# Patient Record
Sex: MALE | Race: WHITE | NOT HISPANIC OR LATINO
[De-identification: names, ages, dates, MRNs, and addresses within clinical notes are randomized per-mention and may not be internally consistent; named-entity substitution may affect disease eponyms.]

---

## 2017-04-26 PROBLEM — Z00.00 ENCOUNTER FOR PREVENTIVE HEALTH EXAMINATION: Status: ACTIVE | Noted: 2017-04-26

## 2017-08-28 ENCOUNTER — APPOINTMENT (OUTPATIENT)
Dept: CARDIOLOGY | Facility: CLINIC | Age: 72
End: 2017-08-28
Payer: MEDICARE

## 2017-08-28 PROCEDURE — 99214 OFFICE O/P EST MOD 30 MIN: CPT

## 2017-08-28 PROCEDURE — 93000 ELECTROCARDIOGRAM COMPLETE: CPT

## 2017-08-31 ENCOUNTER — APPOINTMENT (OUTPATIENT)
Dept: CARDIOLOGY | Facility: CLINIC | Age: 72
End: 2017-08-31
Payer: MEDICARE

## 2017-08-31 PROCEDURE — 93306 TTE W/DOPPLER COMPLETE: CPT

## 2017-09-11 ENCOUNTER — APPOINTMENT (OUTPATIENT)
Dept: CARDIOLOGY | Facility: CLINIC | Age: 72
End: 2017-09-11
Payer: MEDICARE

## 2017-09-11 DIAGNOSIS — Z78.9 OTHER SPECIFIED HEALTH STATUS: ICD-10-CM

## 2017-09-11 DIAGNOSIS — Z86.79 PERSONAL HISTORY OF OTHER DISEASES OF THE CIRCULATORY SYSTEM: ICD-10-CM

## 2017-09-11 DIAGNOSIS — Z87.39 PERSONAL HISTORY OF OTHER DISEASES OF THE MUSCULOSKELETAL SYSTEM AND CONNECTIVE TISSUE: ICD-10-CM

## 2017-09-11 DIAGNOSIS — Z87.891 PERSONAL HISTORY OF NICOTINE DEPENDENCE: ICD-10-CM

## 2017-09-11 DIAGNOSIS — Z80.9 FAMILY HISTORY OF MALIGNANT NEOPLASM, UNSPECIFIED: ICD-10-CM

## 2017-09-11 PROCEDURE — 99214 OFFICE O/P EST MOD 30 MIN: CPT

## 2018-08-15 ENCOUNTER — APPOINTMENT (OUTPATIENT)
Dept: CARDIOLOGY | Facility: CLINIC | Age: 73
End: 2018-08-15
Payer: MEDICARE

## 2018-08-15 PROCEDURE — 93306 TTE W/DOPPLER COMPLETE: CPT

## 2018-08-17 ENCOUNTER — RECORD ABSTRACTING (OUTPATIENT)
Age: 73
End: 2018-08-17

## 2018-08-17 PROBLEM — Z78.9 CURRENT NON-DRINKER OF ALCOHOL: Status: ACTIVE | Noted: 2018-08-17

## 2018-08-17 PROBLEM — Z78.9 DOES NOT USE ILLICIT DRUGS: Status: ACTIVE | Noted: 2018-08-17

## 2018-08-17 PROBLEM — Z86.79 HISTORY OF ATHEROSCLEROSIS: Status: RESOLVED | Noted: 2018-08-17 | Resolved: 2018-08-17

## 2018-08-17 PROBLEM — Z87.891 FORMER SMOKER: Status: ACTIVE | Noted: 2018-08-17

## 2018-08-17 PROBLEM — Z87.39 H/O: ROTATOR CUFF TEAR: Status: RESOLVED | Noted: 2018-08-17 | Resolved: 2018-08-17

## 2018-08-17 PROBLEM — Z86.79 HISTORY OF ABNORMAL ELECTROCARDIOGRAPHY: Status: RESOLVED | Noted: 2018-08-17 | Resolved: 2018-08-17

## 2018-08-17 PROBLEM — Z80.9 FAMILY HISTORY OF MALIGNANT NEOPLASM: Status: ACTIVE | Noted: 2018-08-17

## 2018-08-17 RX ORDER — CALCIUM CARBONATE/VITAMIN D3 600 MG-10
1200 TABLET ORAL
Refills: 0 | Status: ACTIVE | COMMUNITY

## 2018-08-17 RX ORDER — ROSUVASTATIN CALCIUM 10 MG/1
10 TABLET, FILM COATED ORAL
Refills: 0 | Status: ACTIVE | COMMUNITY

## 2018-08-17 RX ORDER — EXTENDED PHENYTOIN SODIUM 100 MG/1
100 CAPSULE ORAL EVERY 8 HOURS
Refills: 0 | Status: ACTIVE | COMMUNITY

## 2018-08-17 RX ORDER — ASPIRIN 81 MG
81 TABLET, DELAYED RELEASE (ENTERIC COATED) ORAL DAILY
Refills: 0 | Status: ACTIVE | COMMUNITY

## 2018-08-17 RX ORDER — GLUCOSAMINE/CHONDR SU A SOD 167-133 MG
125 MCG CAPSULE ORAL DAILY
Refills: 0 | Status: ACTIVE | COMMUNITY

## 2018-08-20 ENCOUNTER — NON-APPOINTMENT (OUTPATIENT)
Age: 73
End: 2018-08-20

## 2018-08-20 ENCOUNTER — APPOINTMENT (OUTPATIENT)
Dept: CARDIOLOGY | Facility: CLINIC | Age: 73
End: 2018-08-20
Payer: MEDICARE

## 2018-08-20 VITALS
HEART RATE: 68 BPM | HEIGHT: 64 IN | DIASTOLIC BLOOD PRESSURE: 70 MMHG | SYSTOLIC BLOOD PRESSURE: 122 MMHG | OXYGEN SATURATION: 99 % | WEIGHT: 170 LBS | BODY MASS INDEX: 29.02 KG/M2

## 2018-08-20 PROCEDURE — 93000 ELECTROCARDIOGRAM COMPLETE: CPT

## 2018-08-20 PROCEDURE — 99215 OFFICE O/P EST HI 40 MIN: CPT

## 2018-09-26 ENCOUNTER — OUTPATIENT (OUTPATIENT)
Dept: OUTPATIENT SERVICES | Facility: HOSPITAL | Age: 73
LOS: 1 days | End: 2018-09-26

## 2019-07-03 ENCOUNTER — NON-APPOINTMENT (OUTPATIENT)
Age: 74
End: 2019-07-03

## 2019-07-03 ENCOUNTER — APPOINTMENT (OUTPATIENT)
Dept: CARDIOLOGY | Facility: CLINIC | Age: 74
End: 2019-07-03
Payer: MEDICARE

## 2019-07-03 VITALS
SYSTOLIC BLOOD PRESSURE: 142 MMHG | HEIGHT: 64 IN | WEIGHT: 174 LBS | BODY MASS INDEX: 29.71 KG/M2 | HEART RATE: 87 BPM | DIASTOLIC BLOOD PRESSURE: 70 MMHG | OXYGEN SATURATION: 98 %

## 2019-07-03 VITALS — DIASTOLIC BLOOD PRESSURE: 76 MMHG | SYSTOLIC BLOOD PRESSURE: 136 MMHG

## 2019-07-03 DIAGNOSIS — E78.5 HYPERLIPIDEMIA, UNSPECIFIED: ICD-10-CM

## 2019-07-03 DIAGNOSIS — Z95.0 PRESENCE OF CARDIAC PACEMAKER: ICD-10-CM

## 2019-07-03 DIAGNOSIS — I35.0 NONRHEUMATIC AORTIC (VALVE) STENOSIS: ICD-10-CM

## 2019-07-03 PROCEDURE — 93000 ELECTROCARDIOGRAM COMPLETE: CPT

## 2019-07-03 PROCEDURE — 99214 OFFICE O/P EST MOD 30 MIN: CPT

## 2019-07-03 RX ORDER — RAMIPRIL 2.5 MG/1
2.5 CAPSULE ORAL TWICE DAILY
Refills: 0 | Status: DISCONTINUED | COMMUNITY
End: 2019-07-03

## 2019-07-03 RX ORDER — FOLIC ACID 1 MG/1
1 TABLET ORAL DAILY
Refills: 0 | Status: DISCONTINUED | COMMUNITY
End: 2019-07-03

## 2019-07-03 NOTE — HISTORY OF PRESENT ILLNESS
[FreeTextEntry1] : \par  #1   CTA and subsequently underwent coronary angiogram on 1/6/2016.  Overall interpretation showed a hyperdynamic wall motion.   Normal left ventricular end-diastolic pressure.  Mild diffuse coronary atherosclerosis.  More moderate disease in a very small-caliber first diagonal branch.\par #2 dyslipidemia. Unable to tolerate Lipitor even at 10 mg dosage. Now tolerating Crestor 10mg.\par #3 former smoker. Quit many decades ago. Does not have any increasing alcohol intake. His weight diet appetite sleepy stable. 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

## 2019-07-03 NOTE — PHYSICAL EXAM
[General Appearance - Well Developed] : well developed [Normal Appearance] : normal appearance [Well Groomed] : well groomed [General Appearance - Well Nourished] : well nourished [No Deformities] : no deformities [General Appearance - In No Acute Distress] : no acute distress [Normal Conjunctiva] : the conjunctiva exhibited no abnormalities [Eyelids - No Xanthelasma] : the eyelids demonstrated no xanthelasmas [Normal Oral Mucosa] : normal oral mucosa [Normal Oropharynx] : normal oropharynx [Normal Jugular Venous A Waves Present] : normal jugular venous A waves present [Normal Jugular Venous V Waves Present] : normal jugular venous V waves present [No Jugular Venous Olivier A Waves] : no jugular venous olivier A waves [Respiration, Rhythm And Depth] : normal respiratory rhythm and effort [Exaggerated Use Of Accessory Muscles For Inspiration] : no accessory muscle use [Auscultation Breath Sounds / Voice Sounds] : lungs were clear to auscultation bilaterally [Heart Sounds] : normal S1 and S2 [Heart Rate And Rhythm] : heart rate and rhythm were normal [Arterial Pulses Normal] : the arterial pulses were normal [Edema] : no peripheral edema present [Veins - Varicosity Changes] : no varicosital changes were noted in the lower extremities [Abdomen Soft] : soft [Abnormal Walk] : normal gait [Nail Clubbing] : no clubbing of the fingernails [Cyanosis, Localized] : no localized cyanosis [Skin Color & Pigmentation] : normal skin color and pigmentation [] : no rash [No Venous Stasis] : no venous stasis [Skin Lesions] : no skin lesions [Oriented To Time, Place, And Person] : oriented to person, place, and time [Mood] : the mood was normal [No Anxiety] : not feeling anxious [Affect] : the affect was normal [FreeTextEntry1] : bozena 1/6 at the base, no s3 s4, no heave or click

## 2019-07-03 NOTE — REVIEW OF SYSTEMS
[Feeling Fatigued] : feeling fatigued [Negative] : Heme/Lymph [Fever] : no fever [Headache] : no headache [Recent Weight Gain (___ Lbs)] : no recent weight gain [Chills] : no chills [Recent Weight Loss (___ Lbs)] : no recent weight loss [Shortness Of Breath] : no shortness of breath [Dyspnea on exertion] : not dyspnea during exertion [Chest  Pressure] : no chest pressure [Chest Pain] : no chest pain [Lower Ext Edema] : no extremity edema [Leg Claudication] : no intermittent leg claudication [Palpitations] : no palpitations

## 2019-07-03 NOTE — REASON FOR VISIT
[Follow-Up - Clinic] : a clinic follow-up of [Coronary Artery Disease] : coronary artery disease [Aortic Stenosis] : aortic stenosis [Dizziness] : dizziness [Hyperlipidemia] : hyperlipidemia [Dyspnea] : dyspnea [Spouse] : spouse [FreeTextEntry1] : 74-year-old gentleman comes in with followup consultation after recent stay in [place], where he had transcatheter aortic valve replacement and permanent pacemaker implantation.\par He had no significant complications.\par He has been more active. And he is going to be back on his tennis court as in the past.\par He is now being followed in [place] as he has decided to move his residence to [place] permanently.\par He has not had any palpitation or syncopal episode.\par He denies any visual disturbances or focal weakness.\par He has no chest pain.\par He has no significant weight reduction.\par

## 2019-07-03 NOTE — DISCUSSION/SUMMARY
[FreeTextEntry1] : \par 74-year-old male with above medical history an active medical problems as noted below.\par #1 status post trans-catheter aortic valve replacement. Bioprosthetic. Stable clinically.\par No complications. Besides needing permanent pacemaker.\par Overall normal functioning.\par Asymptomatic.\par Regular dental check up recommended.\par SBE prophylaxis.\par Follow with his cardiologist in [place].\par #2 hyperlipidemia. Statin therapy. Follow labs\par #3 anemia follow with primary care physician.\par #4 coronary atherosclerosis. Diffuse. Moderate disease in first diagonal. Medical management.\par #5 permanent pacemaker. Follow as advised by physicians in [place]. He is going back in August.\par \par Counseling regarding low saturated fat, salt and carbohydrate intake was reviewed. Active lifestyle and regular. Exercise along with weight management is advised.\par All the above were at length reviewed. Answered all the questions. Thank you very much for this kind referral. Please do not hesitate to give me a call for any question.\par Part of this transcription was done with voice recognition software and phonetically similar errors are common. I apologize for that. Please donot hesitate to call for any questions due to above.\par \par \par \par

## 2019-07-03 NOTE — ASSESSMENT
[FreeTextEntry1] : EKG  July 3, 2019. Edema and atrial paced rhythm.\par Labs from January available to him on the phone were reviewed

## 2019-07-17 ENCOUNTER — TRANSCRIPTION ENCOUNTER (OUTPATIENT)
Age: 74
End: 2019-07-17

## 2019-07-17 ENCOUNTER — NON-APPOINTMENT (OUTPATIENT)
Age: 74
End: 2019-07-17

## 2019-07-17 ENCOUNTER — APPOINTMENT (OUTPATIENT)
Dept: CARDIOLOGY | Facility: CLINIC | Age: 74
End: 2019-07-17
Payer: MEDICARE

## 2019-07-17 VITALS
WEIGHT: 175 LBS | SYSTOLIC BLOOD PRESSURE: 174 MMHG | DIASTOLIC BLOOD PRESSURE: 90 MMHG | HEIGHT: 64 IN | BODY MASS INDEX: 29.88 KG/M2 | HEART RATE: 78 BPM | OXYGEN SATURATION: 99 %

## 2019-07-17 DIAGNOSIS — I25.10 ATHEROSCLEROTIC HEART DISEASE OF NATIVE CORONARY ARTERY W/OUT ANGINA PECTORIS: ICD-10-CM

## 2019-07-17 PROCEDURE — 99215 OFFICE O/P EST HI 40 MIN: CPT

## 2019-07-17 RX ORDER — RAMIPRIL 2.5 MG/1
2.5 CAPSULE ORAL
Qty: 90 | Refills: 1 | Status: ACTIVE | COMMUNITY
Start: 2019-07-17 | End: 1900-01-01

## 2019-07-17 RX ORDER — SPIRONOLACTONE 25 MG/1
25 TABLET ORAL DAILY
Refills: 3 | Status: ACTIVE | COMMUNITY

## 2019-07-17 NOTE — DISCUSSION/SUMMARY
[FreeTextEntry1] : \par 74-year-old male with above medical history an active medical problems as noted below.\par #1 74-year-old gentleman with the above medical history an active medical problems as noted below.\par #1 fatigue, dizziness in association with increasing systolic/diastolic blood pressure after discontinuation of ramipril and mild ankle edema. He has anemia, but hemoglobin is greater than 10. His EKG shows normal sinus rhythm. Clinically, aortic wall appears to be normal functioning and clinically no signs of congestive heart failure.\par At present I recommended\par Low salt diet. He does not have any alcohol intake. He is nonsmoker.\par Ramipril 2.5 mg as in the past. His blood pressure was better controlled on 2.5 mg twice daily.\par Decrease spironolactone to 25 mg and consider changing to chlorthalidone or hydrochlorothiazide\par \par We will repeat echocardiogram to assess for aortic valve function and ejection fraction in presence of changes in symptoms\par \par Low carbohydrate diet recommended with fasting basic metabolic panel again in one week. A. persistent elevation of glucose management of his hyperglycemia with primary care physician.\par \par #2 status post trans-catheter aortic valve replacement. Bioprosthetic. Stable clinically.\par Regular dental check up recommended.\par SBE prophylaxis.\par Follow with his cardiologist in [place].\par # 3 hyperlipidemia. Statin therapy. Follow labs\par # 4 anemia follow with primary care physician.\par # 5 coronary atherosclerosis. Diffuse. Moderate disease in first diagonal. Medical management.\par # 6 permanent pacemaker. Follow as advised by physicians in [place]. He is going back in August.\par \par Counseling regarding low saturated fat, salt and carbohydrate intake was reviewed. Active lifestyle and regular. Exercise along with weight management is advised.\par All the above were at length reviewed. Answered all the questions. Thank you very much for this kind referral. Please do not hesitate to give me a call for any question.\par Part of this transcription was done with voice recognition software and phonetically similar errors are common. I apologize for that. Please donot hesitate to call for any questions due to above.\par \par \par \par

## 2019-07-17 NOTE — ASSESSMENT
[FreeTextEntry1] : EKG  July 3, 2019.  atrial paced rhythm.\par Labs from January available to him on the phone were reviewed\par \par Reviewed on July 17, 2019\par Labs June 12, 2019, sodium 141, potassium 4.9, creatinine 0.98. Glucose 211.\par Hemoglobin 11.8, May 31, 2019\par EKG. July 17, 2019. Most likely normal sinus rhythm.

## 2019-07-17 NOTE — PHYSICAL EXAM
[General Appearance - Well Developed] : well developed [Normal Appearance] : normal appearance [Well Groomed] : well groomed [General Appearance - Well Nourished] : well nourished [No Deformities] : no deformities [General Appearance - In No Acute Distress] : no acute distress [Normal Conjunctiva] : the conjunctiva exhibited no abnormalities [Eyelids - No Xanthelasma] : the eyelids demonstrated no xanthelasmas [Normal Oral Mucosa] : normal oral mucosa [Normal Oropharynx] : normal oropharynx [Normal Jugular Venous A Waves Present] : normal jugular venous A waves present [Normal Jugular Venous V Waves Present] : normal jugular venous V waves present [No Jugular Venous Olivier A Waves] : no jugular venous olivier A waves [Respiration, Rhythm And Depth] : normal respiratory rhythm and effort [Exaggerated Use Of Accessory Muscles For Inspiration] : no accessory muscle use [Auscultation Breath Sounds / Voice Sounds] : lungs were clear to auscultation bilaterally [Heart Rate And Rhythm] : heart rate and rhythm were normal [Heart Sounds] : normal S1 and S2 [Arterial Pulses Normal] : the arterial pulses were normal [Veins - Varicosity Changes] : no varicosital changes were noted in the lower extremities [FreeTextEntry1] : bozena 1/6 at the base, no s3 s4, no heave or click, trace edema [Abdomen Soft] : soft [Abnormal Walk] : normal gait [Nail Clubbing] : no clubbing of the fingernails [Cyanosis, Localized] : no localized cyanosis [Skin Color & Pigmentation] : normal skin color and pigmentation [] : no rash [No Venous Stasis] : no venous stasis [Skin Lesions] : no skin lesions [Oriented To Time, Place, And Person] : oriented to person, place, and time [Affect] : the affect was normal [Mood] : the mood was normal [No Anxiety] : not feeling anxious

## 2019-07-17 NOTE — REASON FOR VISIT
[Acute Exacerbation] : an acute exacerbation of [Aortic Stenosis] : aortic stenosis [Coronary Artery Disease] : coronary artery disease [Dizziness] : dizziness [Dyspnea] : dyspnea [Fatigue] : feeling tired (fatigue) [Hyperlipidemia] : hyperlipidemia [FreeTextEntry1] : 74-year-old gentleman comes in for urgent consultation because of\par Noticing increasing blood pressure at home associated with also dizziness, fatigue, and dyspnea. He has no cough, fever, or chills. He has no PND, orthopnea, or significant worsening pedal edema. She has no significant palpitation. He has no headache, visual disturbances, or focal weakness. He has no chest pain.\par He is no recent hospitalization.\par His activity level recently has decreased as he is unable to go back to play tennis.\par He had labs which showed anemia, which is being treated with iron supplement.\par He also had basic metabolic panel, which showed potassium 4.9, creatinine 0.99, sodium 141. His hemoglobin was 11.8.\par \par  [Spouse] : spouse

## 2019-07-17 NOTE — REVIEW OF SYSTEMS
[Fever] : no fever [Headache] : no headache [Recent Weight Gain (___ Lbs)] : no recent weight gain [Chills] : no chills [Feeling Fatigued] : feeling fatigued [Recent Weight Loss (___ Lbs)] : no recent weight loss [Shortness Of Breath] : no shortness of breath [Dyspnea on exertion] : not dyspnea during exertion [Chest  Pressure] : no chest pressure [Chest Pain] : no chest pain [Lower Ext Edema] : no extremity edema [Leg Claudication] : no intermittent leg claudication [Palpitations] : no palpitations [see HPI] : see HPI [Negative] : Heme/Lymph

## 2019-07-23 ENCOUNTER — APPOINTMENT (OUTPATIENT)
Dept: CARDIOLOGY | Facility: CLINIC | Age: 74
End: 2019-07-23
Payer: MEDICARE

## 2019-07-23 VITALS — HEART RATE: 65 BPM | DIASTOLIC BLOOD PRESSURE: 84 MMHG | SYSTOLIC BLOOD PRESSURE: 160 MMHG | OXYGEN SATURATION: 97 %

## 2019-07-23 DIAGNOSIS — I34.0 NONRHEUMATIC MITRAL (VALVE) INSUFFICIENCY: ICD-10-CM

## 2019-07-23 DIAGNOSIS — Z95.2 PRESENCE OF PROSTHETIC HEART VALVE: ICD-10-CM

## 2019-07-23 DIAGNOSIS — I35.2 NONRHEUMATIC AORTIC (VALVE) STENOSIS WITH INSUFFICIENCY: ICD-10-CM

## 2019-07-23 DIAGNOSIS — I10 ESSENTIAL (PRIMARY) HYPERTENSION: ICD-10-CM

## 2019-07-23 DIAGNOSIS — R53.83 OTHER FATIGUE: ICD-10-CM

## 2019-07-23 PROCEDURE — 93306 TTE W/DOPPLER COMPLETE: CPT

## 2019-07-23 PROCEDURE — 99214 OFFICE O/P EST MOD 30 MIN: CPT

## 2019-07-23 RX ORDER — FOLIC ACID 1 MG/1
1 TABLET ORAL DAILY
Refills: 0 | Status: ACTIVE | COMMUNITY

## 2019-07-23 NOTE — REASON FOR VISIT
[Follow-Up - Clinic] : a clinic follow-up of [Aortic Stenosis] : aortic stenosis [Coronary Artery Disease] : coronary artery disease [Dizziness] : dizziness [Dyspnea] : dyspnea [Fatigue] : feeling tired (fatigue) [FreeTextEntry1] : \par 74-year-old male comes in To review his labs and echocardiogram because of\par Noticing increasing blood pressure at home associated with also dizziness, fatigue, and dyspnea. He has no cough, fever, or chills. He has no PND, orthopnea, or significant worsening pedal edema. She has no significant palpitation. He has no headache, visual disturbances, or focal weakness. He has no chest pain.\par He is no recent hospitalization.\par His activity level recently has decreased as he is unable to go back to play tennis.\par He had labs which showed anemia, which is being treated with iron supplement.\par He also had basic metabolic panel, which showed potassium 4.9, creatinine 0.99, sodium 141. His hemoglobin was 11.8.\par Since last seen his blood pressure readings at home have been very well controlled on ramipril 2.5 mg.\par \par \par  [Spouse] : spouse [Hyperlipidemia] : hyperlipidemia

## 2019-07-23 NOTE — REVIEW OF SYSTEMS
[Fever] : no fever [Headache] : no headache [Recent Weight Gain (___ Lbs)] : no recent weight gain [Chills] : no chills [Feeling Fatigued] : feeling fatigued [Recent Weight Loss (___ Lbs)] : no recent weight loss [Dyspnea on exertion] : not dyspnea during exertion [Chest  Pressure] : no chest pressure [Shortness Of Breath] : no shortness of breath [Leg Claudication] : no intermittent leg claudication [Lower Ext Edema] : no extremity edema [Chest Pain] : no chest pain [see HPI] : see HPI [Palpitations] : no palpitations [Negative] : Endocrine

## 2019-07-23 NOTE — DISCUSSION/SUMMARY
[FreeTextEntry1] :  74-year-old gentleman with the above medical history an active medical problems as noted below.\par #1His fatigue, dyspnea and unclear dizziness continues in spite of better control of blood pressure. His ankle edema has improved. His renal functions are stable. His echocardiogram does show at least mild to moderate valvular aortic regurgitation and moderate mitral regurgitation with preserved systolic function and mild pulmonary hypertension.\par At present. I have recommended him.\par CBC and BNP to rule out anemia.\par Cardiac rehabilitation \par if continued symptoms with stable labs. He would benefit from transesophageal echocardiogram to evaluate aortic bioprosthesis and severity of both aortic insufficiency and mitral regurgitation and appropriate. Further management.\par He is also recommended to manage his diabetes with his primary care physician/endocrinologist.\par If any significant worsening he will call 911\par \par Low carbohydrate diet recommended with fasting basic metabolic panel again in one week. A. persistent elevation of glucose management of his hyperglycemia with primary care physician.\par \par #2 status post trans-catheter aortic valve replacement. Bioprosthetic.\par Regular dental check up recommended.\par SBE prophylaxis.\par Follow with his cardiologist in [place].\par # 3 hyperlipidemia. Statin therapy. Follow labs\par # 4 anemia follow \par # 5 coronary atherosclerosis. Diffuse. Moderate disease in first diagonal. Medical management.\par # 6 permanent pacemaker. Follow as advised by physicians in [place]. He is going back in August.\par \par Counseling regarding low saturated fat, salt and carbohydrate intake was reviewed. Active lifestyle and regular. Exercise along with weight management is advised.\par All the above were at length reviewed. Answered all the questions. Thank you very much for this kind referral. Please do not hesitate to give me a call for any question.\par Part of this transcription was done with voice recognition software and phonetically similar errors are common. I apologize for that. Please donot hesitate to call for any questions due to above.\par \par \par \par

## 2019-07-23 NOTE — PHYSICAL EXAM
[General Appearance - Well Developed] : well developed [Normal Appearance] : normal appearance [Well Groomed] : well groomed [No Deformities] : no deformities [General Appearance - Well Nourished] : well nourished [Normal Conjunctiva] : the conjunctiva exhibited no abnormalities [General Appearance - In No Acute Distress] : no acute distress [Eyelids - No Xanthelasma] : the eyelids demonstrated no xanthelasmas [Normal Oral Mucosa] : normal oral mucosa [Normal Oropharynx] : normal oropharynx [Normal Jugular Venous V Waves Present] : normal jugular venous V waves present [Normal Jugular Venous A Waves Present] : normal jugular venous A waves present [No Jugular Venous Olivier A Waves] : no jugular venous olivier A waves [Respiration, Rhythm And Depth] : normal respiratory rhythm and effort [Exaggerated Use Of Accessory Muscles For Inspiration] : no accessory muscle use [Auscultation Breath Sounds / Voice Sounds] : lungs were clear to auscultation bilaterally [Heart Rate And Rhythm] : heart rate and rhythm were normal [Heart Sounds] : normal S1 and S2 [Arterial Pulses Normal] : the arterial pulses were normal [Veins - Varicosity Changes] : no varicosital changes were noted in the lower extremities [FreeTextEntry1] : bozena 1/6 at the base, no s3 s4, no heave or click, trace edema [Abnormal Walk] : normal gait [Abdomen Soft] : soft [Cyanosis, Localized] : no localized cyanosis [Nail Clubbing] : no clubbing of the fingernails [No Venous Stasis] : no venous stasis [] : no rash [Skin Color & Pigmentation] : normal skin color and pigmentation [Oriented To Time, Place, And Person] : oriented to person, place, and time [Skin Lesions] : no skin lesions [Affect] : the affect was normal [No Anxiety] : not feeling anxious [Mood] : the mood was normal

## 2019-07-25 ENCOUNTER — TRANSCRIPTION ENCOUNTER (OUTPATIENT)
Age: 74
End: 2019-07-25

## 2024-10-09 NOTE — ASSESSMENT
[FreeTextEntry1] : EKG  July 3, 2019.  atrial paced rhythm.\par Labs from January available to him on the phone were reviewed\par \par Reviewed on July 17, 2019\par Labs June 12, 2019, sodium 141, potassium 4.9, creatinine 0.98. Glucose 211.\par Hemoglobin 11.8, May 31, 2019\par EKG. July 17, 2019. Most likely normal sinus rhythm.\par \par Reviewed on July 23, 2019\par Echocardiogram July 23 19 preserved ejection fraction. Moderate paravalvular aortic insufficiency. Moderate mitral regurgitation. Mild pulmonary hypertension.\par Labs July 19, 2019 showed stable BMP Him/He